# Patient Record
Sex: FEMALE
[De-identification: names, ages, dates, MRNs, and addresses within clinical notes are randomized per-mention and may not be internally consistent; named-entity substitution may affect disease eponyms.]

---

## 2021-04-05 ENCOUNTER — APPOINTMENT (OUTPATIENT)
Dept: ORTHOPEDIC SURGERY | Facility: CLINIC | Age: 76
End: 2021-04-05
Payer: MEDICARE

## 2021-04-05 VITALS — BODY MASS INDEX: 24.77 KG/M2 | WEIGHT: 173 LBS | HEIGHT: 70 IN

## 2021-04-05 DIAGNOSIS — Z78.9 OTHER SPECIFIED HEALTH STATUS: ICD-10-CM

## 2021-04-05 DIAGNOSIS — Z87.39 PERSONAL HISTORY OF OTHER DISEASES OF THE MUSCULOSKELETAL SYSTEM AND CONNECTIVE TISSUE: ICD-10-CM

## 2021-04-05 DIAGNOSIS — Z85.3 PERSONAL HISTORY OF MALIGNANT NEOPLASM OF BREAST: ICD-10-CM

## 2021-04-05 DIAGNOSIS — Z86.39 PERSONAL HISTORY OF OTHER ENDOCRINE, NUTRITIONAL AND METABOLIC DISEASE: ICD-10-CM

## 2021-04-05 PROCEDURE — 99203 OFFICE O/P NEW LOW 30 MIN: CPT

## 2021-04-05 PROCEDURE — 73630 X-RAY EXAM OF FOOT: CPT | Mod: 50

## 2021-04-05 NOTE — DISCUSSION/SUMMARY
[de-identified] : This patient most likely has a contusion and sprain of both feet. I recommended ice and elevation. She may be active as tolerated she'll use over-the-counter medications followup will be as needed. I've given her a list of some neurologists names could she would like that.

## 2021-04-05 NOTE — PHYSICAL EXAM
[de-identified] : Exam shows no warmth there is some mild swelling the right side has some black and blue. Minimal tenderness over the heads of the metatarsals. Otherwise a normal exam. Sensory exam is intact. [de-identified] : Bilateral foot x-ray shows no evidence of acute fracture or dislocation.

## 2021-04-05 NOTE — HISTORY OF PRESENT ILLNESS
[FreeTextEntry1] : Location: bilateral feet\par Quality:  aching\par Duration: 04/02/2021\par Context: Fell while trying to get a cab\par Aggravating Factors:  WAlking, weightbearing\par Conservative treatment:  Rest, ice , compression , elevation, cane\par Associated Symptoms:  Swelling, redness\par Prior Studies: n.a\par

## 2021-05-12 ENCOUNTER — APPOINTMENT (OUTPATIENT)
Dept: ORTHOPEDIC SURGERY | Facility: CLINIC | Age: 76
End: 2021-05-12
Payer: MEDICARE

## 2021-05-12 DIAGNOSIS — M25.571 PAIN IN RIGHT ANKLE AND JOINTS OF RIGHT FOOT: ICD-10-CM

## 2021-05-12 DIAGNOSIS — M25.572 PAIN IN LEFT ANKLE AND JOINTS OF LEFT FOOT: ICD-10-CM

## 2021-05-12 PROCEDURE — 99214 OFFICE O/P EST MOD 30 MIN: CPT

## 2021-05-12 NOTE — HISTORY OF PRESENT ILLNESS
[FreeTextEntry1] : She is here for followup evaluation. She did see a neurologist who got MRIs. She is concerned because they showed evidence of bone edema and possible stress fractures. The right is worse than the left. She does have a new Orthotics. She is swimming. She is very weather sensitive. Today she feels much less pain when stamping cold it is worse.

## 2021-05-12 NOTE — DISCUSSION/SUMMARY
[de-identified] : We had a long discussion about this. I do not think that the MRI findings are consistent with her clinical symptoms. I think most likely she does have a neurologic problem given the foot musculature intrinsic atrophy. With regard to bony findings I recommend that she be active as the discomfort allows. She is wearing that shoe wear. This should resolve by itself. Flap will be as needed. All questions answered

## 2021-05-12 NOTE — PHYSICAL EXAM
[de-identified] : Bilateral foot exam shows no obvious warmth or swelling. There is no particular point tenderness. There is just generalized tenderness. Her sensation is slightly diminished in her toes. Range of motion passively is normal. There is pain at the first MTP joint of the left great toe. [de-identified] : MRI reports are in the chart from [facility]. 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